# Patient Record
Sex: FEMALE | Race: WHITE | NOT HISPANIC OR LATINO | Employment: STUDENT | URBAN - METROPOLITAN AREA
[De-identification: names, ages, dates, MRNs, and addresses within clinical notes are randomized per-mention and may not be internally consistent; named-entity substitution may affect disease eponyms.]

---

## 2017-09-11 ENCOUNTER — OFFICE VISIT (OUTPATIENT)
Dept: URGENT CARE | Facility: PHYSICIAN GROUP | Age: 22
End: 2017-09-11

## 2017-09-11 DIAGNOSIS — Z11.1 ENCOUNTER FOR PPD TEST: ICD-10-CM

## 2017-09-11 DIAGNOSIS — Z23 FLU VACCINE NEED: ICD-10-CM

## 2017-09-11 PROCEDURE — 90471 IMMUNIZATION ADMIN: CPT | Mod: SPV | Performed by: FAMILY MEDICINE

## 2017-09-11 PROCEDURE — 86580 TB INTRADERMAL TEST: CPT | Performed by: FAMILY MEDICINE

## 2017-09-11 PROCEDURE — 90686 IIV4 VACC NO PRSV 0.5 ML IM: CPT | Mod: SPV | Performed by: FAMILY MEDICINE

## 2017-09-12 NOTE — PROGRESS NOTES
Stefany Joy is a 21 y.o. female here for a non-provider visit for PPD placement -- Step 1 of 2    Reason for PPD:  school requirement    1. TB evaluation questionnaire completed by patient? Yes      -  If any answers marked yes did you contact a provider prior to placing? Yes  2.  Patient notified to return to clinic for reading on: 09/13/17-09-14-17     3.  PPD Placement documentation completed on TB evaluation questionnaire? YES  4.  Location of TB evaluation questionnaire filed: [facility]

## 2017-09-13 ENCOUNTER — NON-PROVIDER VISIT (OUTPATIENT)
Dept: URGENT CARE | Facility: PHYSICIAN GROUP | Age: 22
End: 2017-09-13

## 2017-09-13 LAB — TB WHEAL 3D P 5 TU DIAM: NORMAL MM

## 2017-09-14 NOTE — PROGRESS NOTES
Stefany Joy is a 21 y.o. female here for a non-provider visit for PPD reading -- Step 1 of 2.      1.  Resulted in Epic under enter/edit results? Yes   2.  TB evaluation questionnaire scanned into chart and original given to patient?No      3. Was induration greater than 0 mm? No.    If Step 1 of 2, when is patient returning for second step (delete if N/A): 9/18/2017    Routed to PCP? No

## 2017-09-18 ENCOUNTER — NON-PROVIDER VISIT (OUTPATIENT)
Dept: URGENT CARE | Facility: PHYSICIAN GROUP | Age: 22
End: 2017-09-18

## 2017-09-18 DIAGNOSIS — Z11.1 PPD SCREENING TEST: ICD-10-CM

## 2017-09-18 PROCEDURE — 86580 TB INTRADERMAL TEST: CPT | Performed by: FAMILY MEDICINE

## 2017-09-19 NOTE — NON-PROVIDER
Stefany Joy is a 21 y.o. female here for a non-provider visit for PPD placement -- Step 2 of 2    Reason for PPD:  school requirement    1. TB evaluation questionnaire completed by patient? Yes      -  If any answers marked yes did you contact a provider prior to placing? Not Indicated  2.  Patient notified to return to clinic for reading on: 9/20/18  3.  PPD Placement documentation completed on TB evaluation questionnaire? Yes  4.  [facility] of TB evaluation questionnaire filed: [facility]

## 2017-09-20 ENCOUNTER — NON-PROVIDER VISIT (OUTPATIENT)
Dept: URGENT CARE | Facility: PHYSICIAN GROUP | Age: 22
End: 2017-09-20

## 2017-09-20 LAB — TB WHEAL 3D P 5 TU DIAM: 0 MM
